# Patient Record
Sex: MALE | Race: WHITE | NOT HISPANIC OR LATINO | Employment: OTHER | URBAN - NONMETROPOLITAN AREA
[De-identification: names, ages, dates, MRNs, and addresses within clinical notes are randomized per-mention and may not be internally consistent; named-entity substitution may affect disease eponyms.]

---

## 2024-01-25 ENCOUNTER — OFFICE VISIT (OUTPATIENT)
Dept: PULMONOLOGY | Facility: CLINIC | Age: 54
End: 2024-01-25
Payer: MEDICAID

## 2024-01-25 VITALS
HEART RATE: 76 BPM | TEMPERATURE: 96.8 F | HEIGHT: 68 IN | SYSTOLIC BLOOD PRESSURE: 112 MMHG | DIASTOLIC BLOOD PRESSURE: 74 MMHG | BODY MASS INDEX: 33.34 KG/M2 | WEIGHT: 220 LBS | OXYGEN SATURATION: 98 %

## 2024-01-25 DIAGNOSIS — F17.210 CIGARETTE NICOTINE DEPENDENCE WITHOUT COMPLICATION: ICD-10-CM

## 2024-01-25 DIAGNOSIS — G47.33 OBSTRUCTIVE SLEEP APNEA: ICD-10-CM

## 2024-01-25 DIAGNOSIS — J44.9 CHRONIC OBSTRUCTIVE PULMONARY DISEASE, UNSPECIFIED COPD TYPE: Primary | ICD-10-CM

## 2024-01-25 PROBLEM — E55.9 VITAMIN D DEFICIENCY: Status: ACTIVE | Noted: 2024-01-25

## 2024-01-25 PROBLEM — M17.12 OSTEOARTHRITIS OF LEFT KNEE: Status: ACTIVE | Noted: 2024-01-25

## 2024-01-25 PROCEDURE — 1159F MED LIST DOCD IN RCRD: CPT | Performed by: PHYSICIAN ASSISTANT

## 2024-01-25 PROCEDURE — 99204 OFFICE O/P NEW MOD 45 MIN: CPT | Performed by: PHYSICIAN ASSISTANT

## 2024-01-25 PROCEDURE — 1160F RVW MEDS BY RX/DR IN RCRD: CPT | Performed by: PHYSICIAN ASSISTANT

## 2024-01-25 RX ORDER — ATORVASTATIN CALCIUM 80 MG/1
TABLET, FILM COATED ORAL
COMMUNITY
Start: 2024-01-19

## 2024-01-25 RX ORDER — ALBUTEROL SULFATE 90 UG/1
AEROSOL, METERED RESPIRATORY (INHALATION)
COMMUNITY
Start: 2023-11-27

## 2024-01-25 RX ORDER — PRASUGREL 10 MG/1
1 TABLET, FILM COATED ORAL DAILY
COMMUNITY
Start: 2024-01-17

## 2024-01-25 RX ORDER — IPRATROPIUM BROMIDE AND ALBUTEROL SULFATE 2.5; .5 MG/3ML; MG/3ML
SOLUTION RESPIRATORY (INHALATION)
COMMUNITY
Start: 2023-12-29

## 2024-01-25 RX ORDER — LISINOPRIL 20 MG/1
1 TABLET ORAL DAILY
COMMUNITY
Start: 2024-01-11

## 2024-01-25 RX ORDER — CYCLOBENZAPRINE HCL 10 MG
TABLET ORAL
COMMUNITY
Start: 2023-11-27

## 2024-01-25 RX ORDER — ASPIRIN 81 MG/1
TABLET, COATED ORAL
COMMUNITY
Start: 2024-01-19

## 2024-01-25 RX ORDER — DAPAGLIFLOZIN 5 MG/1
TABLET, FILM COATED ORAL
COMMUNITY
Start: 2024-01-19

## 2024-01-25 RX ORDER — INSULIN GLARGINE 100 [IU]/ML
INJECTION, SOLUTION SUBCUTANEOUS
COMMUNITY
Start: 2023-11-27

## 2024-01-25 NOTE — PROGRESS NOTES
"Chief Complaint  COPD and Sleep Apnea (Untreated, in lab study. No longer has a PAP due to moving to [place] )    Subjective        Deangelo Mims presents to [facility]  History of Present Illness    Mr. Mims presents today as a new patient evaluation.   States that he was diagnosed with COPD in the past. Has been notable for longstanding smoking history (since his teen years, average of 3 packs per day) but has been focusing on cessation over the last few months. Has switched to snuff tobacco use which reduces pulmonary exposures. Thus, now only using a few cigarettes on occasion.   Notes intermittent times of symptom worsening with shortness of breath. Some phlegm noted, typically in the morning times. Has an albuterol inhaler and duoneb treatments for as needed use.   States that in the past he was prescribed oxygen therapy but no longer has the supplies.   Also notable for sleep apnea in the past but no longer has the device as he moved to [place] during a period of time (change in insurance caused a cancellation of the machine).   Does not rest as well at nighttime without device use.         Objective   Vital Signs:  /74   Pulse 76   Temp 96.8 °F (36 °C)   Ht 172.7 cm (68\")   Wt 99.8 kg (220 lb)   SpO2 98%   BMI 33.45 kg/m²   Estimated body mass index is 33.45 kg/m² as calculated from the following:    Height as of this encounter: 172.7 cm (68\").    Weight as of this encounter: 99.8 kg (220 lb).         Physical Exam  Vitals reviewed.   Constitutional:       General: He is not in acute distress.     Appearance: He is well-developed. He is not diaphoretic.   HENT:      Head: Normocephalic and atraumatic.   Cardiovascular:      Rate and Rhythm: Normal rate and regular rhythm.   Pulmonary:      Effort: Pulmonary effort is normal.      Breath sounds: No wheezing, rhonchi or rales.   Neurological:      Mental Status: He is alert and oriented to " person, place, and time.   Psychiatric:         Behavior: Behavior normal.        Result Review :    The following data was reviewed by: Graciela Gee PA-C on 01/25/2024:  Reviewed referral office note.     No previous labs/imaging assessment available.       Assessment and Plan     Diagnoses and all orders for this visit:    1. Chronic obstructive pulmonary disease, unspecified COPD type (Primary)  -     tiotropium bromide-olodaterol (STIOLTO RESPIMAT) 2.5-2.5 MCG/ACT aerosol solution inhaler; Inhale 2 puffs Daily.  Dispense: 4 g; Refill: 8  -     CT chest low dose wo; Future    2. Cigarette nicotine dependence without complication  -     CT chest low dose wo; Future    3. Obstructive sleep apnea  -     Home Sleep Study; Future          COPD:   Completed spirometry testing in office today - notable for moderate obstruction.   Continue albuterol inhaler as needed  Continue duonebs as needed.   Will start on LAMA/LABA therapy.   Depending on tolerance, will then consider addition of ICS as needed.   Starting on Stiolto - 2 puffs daily.   Provided sample in office.   Completed walk oximetry testing - did not show oxygen desaturation to qualify for supplies.         Obstructive sleep apnea:   Notable for previous history but no longer has his device due to moving states.   Notes unrefreshed sleep without use.   Ordered in home sleep study (will consider in lab if needed).         Cigarette dependence:   Has been making efforts to quit independently. Has switched primarily to snuff tobacco use instead.   Qualifies for LDCT screening due to extensive history - 38 year use, 3 pack per day average previously but has decreased to only a few per week.   Ordered low dose CT chest        Follow Up     Return in about 2 months (around 3/25/2024), or if symptoms worsen or fail to improve, for Next scheduled follow up.  Patient was given instructions and counseling regarding his condition or for health maintenance advice.  Please see specific information pulled into the AVS if appropriate.

## 2024-01-30 LAB
FEV1/FVC: 66 %
FEV1: 2.2 LITERS
FVC VOL RESPIRATORY: 3.34 LITERS
PEF: 3.51 L/S

## 2024-01-30 ASSESSMENT — PULMONARY FUNCTION TESTS
FEV1/FVC: 66
FVC: 3.34
FEV1: 2.20

## 2024-02-06 ENCOUNTER — HOSPITAL ENCOUNTER (OUTPATIENT)
Dept: CT IMAGING | Facility: HOSPITAL | Age: 54
Discharge: HOME | End: 2024-02-06
Admitting: PHYSICIAN ASSISTANT
Payer: MEDICAID

## 2024-02-06 DIAGNOSIS — J44.9 CHRONIC OBSTRUCTIVE PULMONARY DISEASE, UNSPECIFIED COPD TYPE: ICD-10-CM

## 2024-02-06 DIAGNOSIS — F17.210 CIGARETTE NICOTINE DEPENDENCE WITHOUT COMPLICATION: ICD-10-CM

## 2024-02-06 PROCEDURE — 71271 CT THORAX LUNG CANCER SCR C-: CPT

## 2024-02-06 PROCEDURE — 71271 CT THORAX LUNG CANCER SCR C-: CPT | Performed by: RADIOLOGY

## 2024-02-28 ENCOUNTER — TELEPHONE (OUTPATIENT)
Dept: PULMONOLOGY | Facility: CLINIC | Age: 54
End: 2024-02-28
Payer: MEDICAID

## 2024-02-28 NOTE — TELEPHONE ENCOUNTER
Contacted all phone numbers listed, but unable to reach anyone.  Message received stating that numbers were no longer in service.  Home phone number was a wrong number and deleted from the patient's chart.    Will forward results to his PCP.    No pulmonary nodules on the low-dose CT screening.  Other non-pulmonary findings listed.   Can consider repeat low-dose CT in 1 year.      Also added note onto the letter that sleep study was negative for sleep apnea, but there was a concern that 1 portion did not monitor properly.  May need to consider repeat testing in home or in lab.  Requested a call back to discuss this as well.      He is currently scheduled for follow-up in March.

## 2024-09-17 DIAGNOSIS — J44.9 CHRONIC OBSTRUCTIVE PULMONARY DISEASE, UNSPECIFIED COPD TYPE: ICD-10-CM

## 2024-09-18 RX ORDER — TIOTROPIUM BROMIDE AND OLODATEROL 3.124; 2.736 UG/1; UG/1
2 SPRAY, METERED RESPIRATORY (INHALATION) DAILY
Qty: 4 G | Refills: 10 | Status: SHIPPED | OUTPATIENT
Start: 2024-09-18